# Patient Record
Sex: FEMALE | Race: WHITE | NOT HISPANIC OR LATINO | Employment: FULL TIME | URBAN - NONMETROPOLITAN AREA
[De-identification: names, ages, dates, MRNs, and addresses within clinical notes are randomized per-mention and may not be internally consistent; named-entity substitution may affect disease eponyms.]

---
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
## 2020-01-09 PROCEDURE — 93272 ECG/REVIEW INTERPRET ONLY: CPT | Performed by: NURSE PRACTITIONER

## 2020-01-11 ENCOUNTER — CLINICAL SUPPORT NO REQUIREMENTS (OUTPATIENT)
Dept: CARDIOLOGY | Facility: CLINIC | Age: 20
End: 2020-01-11

## 2020-01-11 DIAGNOSIS — R00.2 PALPITATIONS: ICD-10-CM

## 2020-01-11 DIAGNOSIS — R55 SYNCOPE AND COLLAPSE: ICD-10-CM

## 2020-01-13 ENCOUNTER — OFFICE VISIT (OUTPATIENT)
Dept: CARDIOLOGY | Facility: CLINIC | Age: 20
End: 2020-01-13

## 2020-01-13 VITALS
DIASTOLIC BLOOD PRESSURE: 59 MMHG | BODY MASS INDEX: 20.97 KG/M2 | OXYGEN SATURATION: 99 % | WEIGHT: 133.6 LBS | SYSTOLIC BLOOD PRESSURE: 112 MMHG | HEART RATE: 53 BPM | HEIGHT: 67 IN

## 2020-01-13 DIAGNOSIS — R00.2 PALPITATIONS: Primary | ICD-10-CM

## 2020-01-13 DIAGNOSIS — R55 SYNCOPE AND COLLAPSE: ICD-10-CM

## 2020-01-13 PROCEDURE — 99213 OFFICE O/P EST LOW 20 MIN: CPT | Performed by: NURSE PRACTITIONER

## 2020-01-13 NOTE — PROGRESS NOTES
Subjective     Chief Complaint: Palpitations; Loss of Consciousness; and Results    History of Present Illness   Irina Winter is a 19 y.o. female who presents without significant past medical history.  She was initially referred to our clinic for episodes of syncope and near syncope as well as palpitations.  48-hour Holter monitor, ordered by her PCP, was benign.  Her initial visit with me I ordered a 30-day event on her chart and echocardiogram.    The patient's 30-day event monitor record has been reviewed.  There were no episodes of atrial fibrillation.  The patient remained in sinus rhythm or sinus arrhythmia throughout.  Her base heart rate was 79 bpm, max 158 bpm and she had 2 episodes of sinus tachycardia.    At today's visit the patient reports that during the monitoring time she had one episode of dizziness.  She reports she sat down on her bed, symptoms resolved, and did not have any syncope.  She continues to report palpitations.        Current Outpatient Medications:   •  cetaphil (CETAPHIL) lotion, Apply  topically to the appropriate area as directed 2 (Two) Times a Day., Disp: , Rfl:   •  medroxyPROGESTERone (DEPO-PROVERA) 150 MG/ML injection, Inject 150 mg into the appropriate muscle as directed by prescriber Every 3 (Three) Months., Disp: , Rfl:   •  ondansetron (ZOFRAN) 4 MG tablet, TAKE 1 TABLET BY MOUTH THREE TIMES DAILY AS NEEDED FOR NAUSEA FOR 7 DAYS, Disp: , Rfl: 0  •  triamcinolone (KENALOG) 0.1 % cream, APPLY CREAM EXTERNALLY TO AFFECTED AREA TWICE DAILY MIX WITH CETAPHIL AS DIRECTED, Disp: , Rfl: 0     On this date:  The following portions of the patient's history were reviewed and updated as appropriate: allergies, current medications, past family history, past medical history, past social history, past surgical history and problem list.    Review of Systems   Constitution: Negative.   HENT: Negative.    Eyes: Negative.    Cardiovascular: Positive for near-syncope and palpitations.  "  Endocrine: Negative.    Hematologic/Lymphatic: Bruises/bleeds easily.   Skin: Negative.    Musculoskeletal: Negative.    Gastrointestinal: Negative.    Genitourinary: Negative.    Neurological: Negative.    Psychiatric/Behavioral: Negative.    Allergic/Immunologic: Negative.          Objective     /59 (BP Location: Left arm)   Pulse 53   Ht 170.2 cm (67\")   Wt 60.6 kg (133 lb 9.6 oz)   SpO2 99%   BMI 20.92 kg/m²     Physical Exam   Constitutional: She is oriented to person, place, and time. She appears well-developed and well-nourished.   HENT:   Head: Normocephalic and atraumatic.   Eyes: Pupils are equal, round, and reactive to light.   Neck: No JVD present.   Cardiovascular: Normal rate, regular rhythm and intact distal pulses. Exam reveals no gallop and no friction rub.   No murmur heard.  Pulmonary/Chest: Effort normal and breath sounds normal. No respiratory distress. She has no wheezes. She has no rales.   Neurological: She is alert and oriented to person, place, and time.   Skin: Skin is warm and dry.   Psychiatric: She has a normal mood and affect.   Vitals reviewed.      Procedures      Assessment/Plan       Irina was seen today for palpitations, loss of consciousness and results.    Diagnoses and all orders for this visit:    Palpitations    Syncope and collapse          Plan of care was reviewed. Patient agreed with plan of care.    We will plan to have patient's echocardiogram rescheduled and she will return to clinic sometime after that.    Return in about 3 weeks (around 2/3/2020).      FATIMAH Hernandez  "

## 2020-02-17 ENCOUNTER — OFFICE VISIT (OUTPATIENT)
Dept: CARDIOLOGY | Facility: CLINIC | Age: 20
End: 2020-02-17

## 2020-02-17 VITALS
OXYGEN SATURATION: 99 % | BODY MASS INDEX: 21.53 KG/M2 | HEART RATE: 68 BPM | HEIGHT: 67 IN | DIASTOLIC BLOOD PRESSURE: 69 MMHG | WEIGHT: 137.2 LBS | SYSTOLIC BLOOD PRESSURE: 113 MMHG

## 2020-02-17 DIAGNOSIS — R00.2 PALPITATIONS: Primary | ICD-10-CM

## 2020-02-17 DIAGNOSIS — R55 SYNCOPE AND COLLAPSE: ICD-10-CM

## 2020-02-17 PROCEDURE — 99212 OFFICE O/P EST SF 10 MIN: CPT | Performed by: NURSE PRACTITIONER

## 2020-02-17 NOTE — PROGRESS NOTES
Subjective     Chief Complaint: Palpitations and Loss of Consciousness    History of Present Illness   Irina Winter is a 19 y.o. female who presents without significant past medical history.  She initially was referred to our clinic for episodes of syncope and near syncope as well as palpitations.  48-hour Holter monitor, ordered by her PCP, was benign.  At her initial visit both a 30-day event monitor and echocardiogram were ordered.  The event monitor was unremarkable.  The echocardiogram had not been completed because the patient had missed her procedure date.  She presents today for follow-up.    At today's visit she reports that she has not had any additional episodes of syncope since last seen.  She does report one episode of dizziness at which time she was able to sit down and the dizziness passed.  She has yet to have her echocardiogram completed.    Current Outpatient Medications:   •  cetaphil (CETAPHIL) lotion, Apply  topically to the appropriate area as directed 2 (Two) Times a Day., Disp: , Rfl:   •  medroxyPROGESTERone (DEPO-PROVERA) 150 MG/ML injection, Inject 150 mg into the appropriate muscle as directed by prescriber Every 3 (Three) Months., Disp: , Rfl:   •  ondansetron (ZOFRAN) 4 MG tablet, TAKE 1 TABLET BY MOUTH THREE TIMES DAILY AS NEEDED FOR NAUSEA FOR 7 DAYS, Disp: , Rfl: 0  •  triamcinolone (KENALOG) 0.1 % cream, APPLY CREAM EXTERNALLY TO AFFECTED AREA TWICE DAILY MIX WITH CETAPHIL AS DIRECTED, Disp: , Rfl: 0     On this date:  The following portions of the patient's history were reviewed and updated as appropriate: allergies, current medications, past family history, past medical history, past social history, past surgical history and problem list.    Review of Systems   Constitution: Negative for malaise/fatigue.   Cardiovascular: Negative for chest pain, dyspnea on exertion, irregular heartbeat, leg swelling, near-syncope, orthopnea, palpitations, paroxysmal nocturnal dyspnea and  "syncope.   Respiratory: Negative for shortness of breath.    Hematologic/Lymphatic: Does not bruise/bleed easily.   Neurological: Positive for dizziness. Negative for light-headedness and weakness.         Objective     /69 (BP Location: Left arm)   Pulse 68   Ht 170.2 cm (67\")   Wt 62.2 kg (137 lb 3.2 oz)   SpO2 99%   BMI 21.49 kg/m²     Physical Exam   Constitutional: She appears well-developed and well-nourished.   HENT:   Head: Normocephalic and atraumatic.   Eyes: Pupils are equal, round, and reactive to light.   Neck: No JVD present.   Cardiovascular: Normal rate, regular rhythm and intact distal pulses. Exam reveals no gallop and no friction rub.   No murmur heard.  Pulmonary/Chest: Effort normal and breath sounds normal. No respiratory distress. She has no wheezes. She has no rales.   Skin: Skin is warm and dry.   Psychiatric: She has a normal mood and affect.   Vitals reviewed.      Procedures      Assessment/Plan       Irina was seen today for palpitations and loss of consciousness.    Diagnoses and all orders for this visit:    Palpitations    Syncope and collapse      Plan of care was reviewed.  Patient agreed with plan of care.    Patient is to have echocardiogram completed and will be called with results.    Return if symptoms worsen or fail to improve.      FATIMAH Hernandez  "

## 2021-08-25 ENCOUNTER — TRANSCRIBE ORDERS (OUTPATIENT)
Dept: ADMINISTRATIVE | Facility: HOSPITAL | Age: 21
End: 2021-08-25

## 2021-08-25 DIAGNOSIS — Z11.59 SPECIAL SCREENING EXAMINATION FOR UNSPECIFIED VIRAL DISEASE: Primary | ICD-10-CM

## 2021-08-27 ENCOUNTER — TRANSCRIBE ORDERS (OUTPATIENT)
Dept: ADMINISTRATIVE | Facility: HOSPITAL | Age: 21
End: 2021-08-27

## 2021-08-27 DIAGNOSIS — Z11.59 SPECIAL SCREENING EXAMINATION FOR UNSPECIFIED VIRAL DISEASE: Primary | ICD-10-CM

## 2021-11-01 ENCOUNTER — TRANSCRIBE ORDERS (OUTPATIENT)
Dept: ADMINISTRATIVE | Facility: HOSPITAL | Age: 21
End: 2021-11-01

## 2021-11-01 ENCOUNTER — HOSPITAL ENCOUNTER (OUTPATIENT)
Dept: RESPIRATORY THERAPY | Facility: HOSPITAL | Age: 21
Discharge: HOME | End: 2021-11-01
Admitting: NURSE PRACTITIONER

## 2021-11-01 DIAGNOSIS — R55 SYNCOPE AND COLLAPSE: Primary | ICD-10-CM

## 2021-11-01 DIAGNOSIS — R55 SYNCOPE AND COLLAPSE: ICD-10-CM

## 2021-11-01 PROCEDURE — 93246 EXT ECG>7D<15D RECORDING: CPT

## 2021-11-16 ENCOUNTER — HOSPITAL ENCOUNTER (OUTPATIENT)
Dept: CARDIOLOGY | Facility: HOSPITAL | Age: 21
Discharge: HOME | End: 2021-11-16
Admitting: NURSE PRACTITIONER

## 2021-11-16 DIAGNOSIS — R55 SYNCOPE AND COLLAPSE: ICD-10-CM

## 2021-11-16 LAB
BH CV ECHO MEAS - % IVS THICK: 31.6 %
BH CV ECHO MEAS - % LVPW THICK: 57.6 %
BH CV ECHO MEAS - ACS: 2.1 CM
BH CV ECHO MEAS - AO MAX PG: 6.1 MMHG
BH CV ECHO MEAS - AO MEAN PG: 3 MMHG
BH CV ECHO MEAS - AO ROOT AREA (BSA CORRECTED): 1.4
BH CV ECHO MEAS - AO ROOT AREA: 4.7 CM^2
BH CV ECHO MEAS - AO ROOT DIAM: 2.5 CM
BH CV ECHO MEAS - AO V2 MAX: 123 CM/SEC
BH CV ECHO MEAS - AO V2 MEAN: 87.5 CM/SEC
BH CV ECHO MEAS - AO V2 VTI: 30.4 CM
BH CV ECHO MEAS - BSA(HAYCOCK): 1.7 M^2
BH CV ECHO MEAS - BSA: 1.7 M^2
BH CV ECHO MEAS - BZI_BMI: 21.5 KILOGRAMS/M^2
BH CV ECHO MEAS - BZI_METRIC_HEIGHT: 170.2 CM
BH CV ECHO MEAS - BZI_METRIC_WEIGHT: 62.1 KG
BH CV ECHO MEAS - EDV(CUBED): 99.3 ML
BH CV ECHO MEAS - EDV(MOD-SP4): 73.7 ML
BH CV ECHO MEAS - EDV(TEICH): 98.8 ML
BH CV ECHO MEAS - EF(CUBED): 67.6 %
BH CV ECHO MEAS - EF(MOD-SP4): 64 %
BH CV ECHO MEAS - EF(TEICH): 59.2 %
BH CV ECHO MEAS - ESV(CUBED): 32.2 ML
BH CV ECHO MEAS - ESV(MOD-SP4): 26.5 ML
BH CV ECHO MEAS - ESV(TEICH): 40.3 ML
BH CV ECHO MEAS - FS: 31.3 %
BH CV ECHO MEAS - IVS/LVPW: 0.98
BH CV ECHO MEAS - IVSD: 0.78 CM
BH CV ECHO MEAS - IVSS: 1 CM
BH CV ECHO MEAS - LA DIMENSION: 2.3 CM
BH CV ECHO MEAS - LA/AO: 0.94
BH CV ECHO MEAS - LV DIASTOLIC VOL/BSA (35-75): 42.8 ML/M^2
BH CV ECHO MEAS - LV MASS(C)D: 117.5 GRAMS
BH CV ECHO MEAS - LV MASS(C)DI: 68.2 GRAMS/M^2
BH CV ECHO MEAS - LV MASS(C)S: 110 GRAMS
BH CV ECHO MEAS - LV MASS(C)SI: 63.9 GRAMS/M^2
BH CV ECHO MEAS - LV SYSTOLIC VOL/BSA (12-30): 15.4 ML/M^2
BH CV ECHO MEAS - LVIDD: 4.6 CM
BH CV ECHO MEAS - LVIDS: 3.2 CM
BH CV ECHO MEAS - LVLD AP4: 8.2 CM
BH CV ECHO MEAS - LVLS AP4: 7 CM
BH CV ECHO MEAS - LVOT AREA (M): 2.8 CM^2
BH CV ECHO MEAS - LVOT AREA: 2.8 CM^2
BH CV ECHO MEAS - LVOT DIAM: 1.9 CM
BH CV ECHO MEAS - LVPWD: 0.8 CM
BH CV ECHO MEAS - LVPWS: 1.3 CM
BH CV ECHO MEAS - MV A MAX VEL: 43.8 CM/SEC
BH CV ECHO MEAS - MV E MAX VEL: 93.5 CM/SEC
BH CV ECHO MEAS - MV E/A: 2.1
BH CV ECHO MEAS - PA ACC TIME: 0.17 SEC
BH CV ECHO MEAS - PA PR(ACCEL): 1.2 MMHG
BH CV ECHO MEAS - RAP SYSTOLE: 10 MMHG
BH CV ECHO MEAS - RVSP: 24.6 MMHG
BH CV ECHO MEAS - SI(AO): 83.2 ML/M^2
BH CV ECHO MEAS - SI(CUBED): 39 ML/M^2
BH CV ECHO MEAS - SI(MOD-SP4): 27.4 ML/M^2
BH CV ECHO MEAS - SI(TEICH): 34 ML/M^2
BH CV ECHO MEAS - SV(AO): 143.3 ML
BH CV ECHO MEAS - SV(CUBED): 67.1 ML
BH CV ECHO MEAS - SV(MOD-SP4): 47.2 ML
BH CV ECHO MEAS - SV(TEICH): 58.5 ML
BH CV ECHO MEAS - TR MAX VEL: 191 CM/SEC
MAXIMAL PREDICTED HEART RATE: 199 BPM
STRESS TARGET HR: 169 BPM

## 2021-11-16 PROCEDURE — 93306 TTE W/DOPPLER COMPLETE: CPT | Performed by: INTERNAL MEDICINE

## 2021-11-16 PROCEDURE — 93306 TTE W/DOPPLER COMPLETE: CPT

## 2021-11-30 ENCOUNTER — TRANSCRIBE ORDERS (OUTPATIENT)
Dept: ADMINISTRATIVE | Facility: HOSPITAL | Age: 21
End: 2021-11-30

## 2021-11-30 DIAGNOSIS — R56.9 WITNESSED SEIZURE-LIKE ACTIVITY (HCC): Primary | ICD-10-CM

## 2021-12-07 ENCOUNTER — APPOINTMENT (OUTPATIENT)
Dept: CT IMAGING | Facility: HOSPITAL | Age: 21
End: 2021-12-07

## 2024-06-20 ENCOUNTER — TRANSCRIBE ORDERS (OUTPATIENT)
Dept: ADMINISTRATIVE | Facility: HOSPITAL | Age: 24
End: 2024-06-20
Payer: COMMERCIAL

## 2024-06-20 DIAGNOSIS — R59.0 CERVICAL LYMPHADENOPATHY: Primary | ICD-10-CM

## 2024-09-03 ENCOUNTER — OFFICE VISIT (OUTPATIENT)
Dept: CARDIOLOGY | Facility: CLINIC | Age: 24
End: 2024-09-03
Payer: COMMERCIAL

## 2024-09-03 VITALS — BODY MASS INDEX: 20.59 KG/M2 | WEIGHT: 131.2 LBS | OXYGEN SATURATION: 99 % | HEIGHT: 67 IN

## 2024-09-03 DIAGNOSIS — R06.02 SHORTNESS OF BREATH: ICD-10-CM

## 2024-09-03 DIAGNOSIS — R55 SYNCOPE AND COLLAPSE: ICD-10-CM

## 2024-09-03 DIAGNOSIS — R42 DIZZINESS: ICD-10-CM

## 2024-09-03 DIAGNOSIS — R00.2 PALPITATIONS: Primary | ICD-10-CM

## 2024-09-03 PROBLEM — J45.909 ASTHMA: Status: ACTIVE | Noted: 2024-09-03

## 2024-09-03 PROCEDURE — 99204 OFFICE O/P NEW MOD 45 MIN: CPT | Performed by: INTERNAL MEDICINE

## 2024-09-03 PROCEDURE — 93000 ELECTROCARDIOGRAM COMPLETE: CPT | Performed by: INTERNAL MEDICINE

## 2024-09-03 RX ORDER — ATENOLOL 25 MG/1
12.5 TABLET ORAL DAILY
Qty: 15 TABLET | Refills: 11 | Status: SHIPPED | OUTPATIENT
Start: 2024-09-03

## 2024-09-03 NOTE — PROGRESS NOTES
Subjective   Irina Winter is a 24 y.o. female     Chief Complaint   Patient presents with    Establish Care     Here for eval. Per pcp    Dizziness    Syncope    Shortness of Breath    Palpitations       PROBLEM LIST:     Syncope  SOB  Palpitations  Asthma    Denies Rheumatic / Scarlet fever    Specialty Problems          Cardiology Problems    Palpitations             HPI:  Ms. Irina Winter is a 24-year-old female patient of [facility] seen today for evaluation of syncope.    The patient describes episodes of severe dizziness and syncope dating to age 13.  Symptoms have been primarily orthostatic in nature although the patient describes presyncope without significant orthostatic change.  Symptoms seem to be worsened by emotional stress.  As of late the patient has increased sodium intake and symptoms seem to have improved.  She describes that, most typically, she experiences palpitations before episodes of presyncope or syncope.  There is no loss of bowel or bladder control, no postictal state, and no Aurelio's paralysis.  However, the patient feels profoundly fatigued after these episodes.    Symptoms have diminished in frequency, intensity, and duration of late.  They now occur on average approximately once monthly.  Prior event monitor demonstrated no dysrhythmic substrate, by report, the patient had an episode of syncope.  Ms. Winter has had extensive neurologic evaluation including EEG and head imaging.  She was felt not to have a significant seizure disorder or neurologic event contributing to ongoing symptoms.                      PRIOR MEDICATIONS    Current Outpatient Medications on File Prior to Visit   Medication Sig Dispense Refill    [DISCONTINUED] cetaphil (CETAPHIL) lotion Apply  topically to the appropriate area as directed 2 (Two) Times a Day.      [DISCONTINUED] medroxyPROGESTERone (DEPO-PROVERA) 150 MG/ML injection Inject 150 mg into the appropriate muscle as directed by prescriber Every 3  (Three) Months.      [DISCONTINUED] ondansetron (ZOFRAN) 4 MG tablet TAKE 1 TABLET BY MOUTH THREE TIMES DAILY AS NEEDED FOR NAUSEA FOR 7 DAYS  0    [DISCONTINUED] triamcinolone (KENALOG) 0.1 % cream APPLY CREAM EXTERNALLY TO AFFECTED AREA TWICE DAILY MIX WITH CETAPHIL AS DIRECTED  0     No current facility-administered medications on file prior to visit.       ALLERGIES:    Patient has no known allergies.    PAST MEDICAL HISTORY:    Past Medical History:   Diagnosis Date    Asthma     Eczema        SURGICAL HISTORY:    Past Surgical History:   Procedure Laterality Date    WISDOM TOOTH EXTRACTION         SOCIAL HISTORY:    Social History     Socioeconomic History    Marital status: Single   Tobacco Use    Smoking status: Never    Smokeless tobacco: Never   Substance and Sexual Activity    Alcohol use: No    Drug use: No    Sexual activity: Defer       FAMILY HISTORY:    Family History   Problem Relation Age of Onset    Hypertension Mother     Anxiety disorder Father        Review of Systems   Constitutional:  Positive for fatigue. Negative for chills, diaphoresis, fever and unexpected weight change.   HENT: Negative.     Eyes:  Positive for visual disturbance (glasses prn).   Respiratory:  Positive for shortness of breath.    Cardiovascular:  Positive for palpitations and leg swelling (occas. after syncopal episodes). Negative for chest pain.   Gastrointestinal:  Negative for blood in stool (denies melena,hemoptysis), constipation and diarrhea.   Endocrine: Negative for cold intolerance and heat intolerance.   Genitourinary: Negative.    Musculoskeletal: Negative.    Skin: Negative.    Allergic/Immunologic: Negative.    Neurological:  Positive for dizziness, syncope, weakness (overall after episodes) and light-headedness.   Hematological:  Bruises/bleeds easily.   Psychiatric/Behavioral:  Positive for sleep disturbance.        VISIT VITALS:  Vitals:    09/03/24 1327   BP: 129/79   BP Location: Left arm   Patient  "Position: Sitting   Pulse: 81   SpO2: 99%   Weight: 59.5 kg (131 lb 3.2 oz)   Height: 170.2 cm (67\")      /79 (BP Location: Left arm, Patient Position: Sitting)   Pulse 81   Ht 170.2 cm (67\")   Wt 59.5 kg (131 lb 3.2 oz)   SpO2 99%   BMI 20.55 kg/m²     RECENT LABS:    Objective       Physical Exam  Vitals and nursing note reviewed.   Constitutional:       General: She is not in acute distress.     Appearance: She is well-developed.   HENT:      Head: Normocephalic and atraumatic.   Eyes:      Conjunctiva/sclera: Conjunctivae normal.      Pupils: Pupils are equal, round, and reactive to light.      Comments: No ptosis or lid lag  Extra ocular motions intact  PRANAY   Neck:      Vascular: No carotid bruit, hepatojugular reflux or JVD.      Trachea: No tracheal deviation.      Comments: Nl. Carotid upstrokes  Cardiovascular:      Rate and Rhythm: Normal rate and regular rhythm.      Pulses:           Radial pulses are 2+ on the right side and 2+ on the left side.      Heart sounds: Normal heart sounds, S1 normal and S2 normal. No murmur heard.     No friction rub. No S3 or S4 sounds.   Pulmonary:      Effort: Pulmonary effort is normal.      Breath sounds: Normal breath sounds. No wheezing, rhonchi or rales.      Comments: Nl. Expir. Phase  Nl. Breath sound intensity  Abdominal:      General: Bowel sounds are normal. There is no distension or abdominal bruit.      Palpations: Abdomen is soft. There is no mass.      Tenderness: There is no abdominal tenderness. There is no guarding or rebound.      Comments: No organomegaly   Musculoskeletal:         General: No tenderness or deformity. Normal range of motion.      Cervical back: Normal range of motion and neck supple.      Right lower leg: No edema.      Left lower leg: No edema.      Comments: LLE, no edema, palpable pedal pulses  RLE, no edema, palpable pedal pulses   Skin:     General: Skin is warm and dry.      Coloration: Skin is not pale.      " Findings: No erythema or rash.   Neurological:      Mental Status: She is alert and oriented to person, place, and time.   Psychiatric:         Behavior: Behavior normal.         Thought Content: Thought content normal.         Judgment: Judgment normal.         ECG 12 Lead    Date/Time: 9/3/2024 1:32 PM  Performed by: Ramos Braun MD    Authorized by: Ramos Braun MD  Comparison: compared with previous ECG from 12/11/2019          Assessment & Plan   #1.  Predominantly orthostatic presyncope and syncope.  I think that POTS is a reasonable presumptive diagnosis given the patient's current symptoms.  We discussed etiologies and treatment options.  I would like to start very low-dose beta-blocker with a atenolol 12.5 mg daily particularly given the patient's symptoms of palpitations preceding events.  Additionally, beta-blocker should be safe if the patient were to become pregnant.  If symptoms are not improved or are inadequately controlled we will increase beta-blocker therapy then add mineralocorticoid as needed.  If symptoms cannot be adequately controlled with those maneuvers we will refer to POTS clinic.    2.  Palpitations.  Prior event monitoring was unrevealing.  I see no need to repeat that study at present.    3.  Ms. Winter will follow with Mary Ann Watts as instructed we will plan on seeing her in follow-up after 1 month or on appearing basis as discussed.   Diagnosis Plan   1. Palpitations        2. Syncope and collapse        3. Shortness of breath        4. Dizziness            No follow-ups on file.         Irina Winter  reports that she has never smoked. She has never used smokeless tobacco. I have educated her on the risk of diseases from using tobacco products such as cancer, COPD, and heart disease.               BMI is within normal parameters. No other follow-up for BMI required.             Electronically signed by:    Scribed for Ramos Braun MD by Ana Mazariegos LPN on September  3, 2024  at 13:31 EDT    I, Ramos Braun MD personally performed the services described in this documentation as scribed by the above named individual in my presence, and it is both accurate and complete. September 3, 2024 13:31 EDT      Dictated Utilizing Dragon Dictation: Part of this note may be an electronic transcription/translation of spoken language to printed text using the Dragon Dictation System.

## 2024-11-14 ENCOUNTER — TELEPHONE (OUTPATIENT)
Dept: CARDIOLOGY | Facility: CLINIC | Age: 24
End: 2024-11-14
Payer: COMMERCIAL

## 2024-11-14 NOTE — TELEPHONE ENCOUNTER
Caller: Irina Winter    Relationship: Self    Best call back number: [phone]    What is the best time to reach you: ANY    Who are you requesting to speak with (clinical staff, provider,  specific staff member): CLINICAL    What was the call regarding: DUE TO PT'S DX, SHE IS WONDERING IF DR. 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 SHE DRIVES AROUND 1 HOUR TOTAL TIME AND IS WORRIED WITH HER POTS THAT IF IT FLARES UP BECAUSE SHE DOES WORK THIRD SHIFT, SHE IS WORRIED SOMETHING MIGHT HAPPEN IN THE MIDDLE OF THE NIGHT WHILE DRIVING.

## 2024-11-20 ENCOUNTER — OFFICE VISIT (OUTPATIENT)
Dept: CARDIOLOGY | Facility: CLINIC | Age: 24
End: 2024-11-20
Payer: COMMERCIAL

## 2024-11-20 VITALS
HEIGHT: 67 IN | BODY MASS INDEX: 21.82 KG/M2 | WEIGHT: 139 LBS | DIASTOLIC BLOOD PRESSURE: 74 MMHG | SYSTOLIC BLOOD PRESSURE: 115 MMHG | OXYGEN SATURATION: 97 % | HEART RATE: 59 BPM

## 2024-11-20 DIAGNOSIS — R00.2 PALPITATIONS: Primary | ICD-10-CM

## 2024-11-20 DIAGNOSIS — G90.A POTS (POSTURAL ORTHOSTATIC TACHYCARDIA SYNDROME): ICD-10-CM

## 2024-11-20 DIAGNOSIS — R55 SYNCOPE AND COLLAPSE: ICD-10-CM

## 2024-11-20 PROCEDURE — 99214 OFFICE O/P EST MOD 30 MIN: CPT | Performed by: PHYSICIAN ASSISTANT

## 2024-11-20 RX ORDER — FLUDROCORTISONE ACETATE 0.1 MG/1
0.1 TABLET ORAL DAILY
Qty: 30 TABLET | Refills: 5 | Status: SHIPPED | OUTPATIENT
Start: 2024-11-20

## 2024-11-20 RX ORDER — METOPROLOL SUCCINATE 25 MG/1
12.5 TABLET, EXTENDED RELEASE ORAL DAILY
Qty: 30 TABLET | Refills: 11 | Status: SHIPPED | OUTPATIENT
Start: 2024-11-20

## 2024-11-20 NOTE — PROGRESS NOTES
Problem list     Subjective   Irina Winter is a 24 y.o. female     Chief Complaint   Patient presents with    Follow-up    Palpitations   PROBLEM LIST:      Syncope  SOB  Palpitations  Asthma       HPI    Patient is a 24-year-old female with history of POTS that presents back to the office for follow-up.  She was initially evaluated and placed on atenolol to help with palpitations.    Patient is doing better in regards to palpitations.  However, she is continue to have significant dizziness upon standing and presyncope.  She does not describe any chest pain or pressure.  No complaints of dyspnea.  No PND or orthopnea.    Otherwise, she is doing well.  Her dizziness seems to be the main issue.    Current Outpatient Medications on File Prior to Visit   Medication Sig Dispense Refill    [DISCONTINUED] atenolol (TENORMIN) 25 MG tablet Take 0.5 tablets by mouth Daily. 15 tablet 11     No current facility-administered medications on file prior to visit.       Patient has no known allergies.    Past Medical History:   Diagnosis Date    Asthma     Eczema     Palpitation     Syncope and collapse        Social History     Socioeconomic History    Marital status: Single   Tobacco Use    Smoking status: Never    Smokeless tobacco: Never   Substance and Sexual Activity    Alcohol use: No    Drug use: No    Sexual activity: Defer       Family History   Problem Relation Age of Onset    Hypertension Mother     Anxiety disorder Father        Review of Systems   Constitutional: Negative.  Negative for activity change, appetite change, chills, fatigue and fever.   HENT: Negative.  Negative for congestion, sinus pressure and sinus pain.    Eyes: Negative.  Negative for visual disturbance.   Respiratory: Negative.  Negative for apnea, cough, chest tightness, shortness of breath and wheezing.    Cardiovascular: Negative.  Positive for palpitations. Negative for chest pain and leg swelling.   Gastrointestinal: Negative.  Negative for  "blood in stool.   Endocrine: Negative.  Negative for cold intolerance and heat intolerance.   Genitourinary: Negative.  Negative for hematuria.   Musculoskeletal: Negative.  Negative for gait problem.   Skin: Negative.  Negative for color change, rash and wound.   Allergic/Immunologic: Negative.  Negative for environmental allergies and food allergies.   Neurological:  Positive for dizziness. Negative for syncope, weakness, light-headedness, numbness and headaches.        POTS   Hematological: Negative.  Does not bruise/bleed easily.   Psychiatric/Behavioral: Negative.  Negative for sleep disturbance.        Objective   Vitals:    11/20/24 0927   BP: 115/74   BP Location: Right arm   Patient Position: Sitting   Cuff Size: Adult   Pulse: 59   SpO2: 97%   Weight: 63 kg (139 lb)   Height: 170.2 cm (67\")      /74 (BP Location: Right arm, Patient Position: Sitting, Cuff Size: Adult)   Pulse 59   Ht 170.2 cm (67\")   Wt 63 kg (139 lb)   SpO2 97%   BMI 21.77 kg/m²     Lab Results (most recent)       None            Physical Exam  Vitals and nursing note reviewed.   Constitutional:       General: She is not in acute distress.     Appearance: Normal appearance. She is well-developed.   HENT:      Head: Normocephalic and atraumatic.   Eyes:      General: No scleral icterus.        Right eye: No discharge.         Left eye: No discharge.      Conjunctiva/sclera: Conjunctivae normal.   Neck:      Vascular: No carotid bruit.   Cardiovascular:      Rate and Rhythm: Normal rate and regular rhythm.      Heart sounds: Normal heart sounds. No murmur heard.     No friction rub. No gallop.   Pulmonary:      Effort: Pulmonary effort is normal. No respiratory distress.      Breath sounds: Normal breath sounds. No wheezing or rales.   Chest:      Chest wall: No tenderness.   Musculoskeletal:      Right lower leg: No edema.      Left lower leg: No edema.   Skin:     General: Skin is warm and dry.      Coloration: Skin is not " pale.      Findings: No erythema or rash.   Neurological:      Mental Status: She is alert and oriented to person, place, and time.      Cranial Nerves: No cranial nerve deficit.   Psychiatric:         Behavior: Behavior normal.         Procedure   Procedures       Assessment & Plan     Problems Addressed this Visit          Cardiac and Vasculature    Palpitations - Primary    POTS (postural orthostatic tachycardia syndrome)    Relevant Medications    metoprolol succinate XL (TOPROL-XL) 25 MG 24 hr tablet       Symptoms and Signs    Syncope and collapse     Diagnoses         Codes Comments    Palpitations    -  Primary ICD-10-CM: R00.2  ICD-9-CM: 785.1     Syncope and collapse     ICD-10-CM: R55  ICD-9-CM: 780.2     POTS (postural orthostatic tachycardia syndrome)     ICD-10-CM: G90.A  ICD-9-CM: 427.89             Recommendations  1.  Patient is a 24-year-old female presenting back to the office for evaluation.  Patient has history of POTS and has had issues with dizziness.  Underwent placed on fludrocortisone significant from that standpoint want to call back in 1 week with symptom check.    2.  We are stopping atenolol transitioning to metoprolol to help in regards to palpitations and for a safer pregnancy category.  We did discuss that if she became pregnant, to let us know as she may need to stop these medications.    3.  Otherwise, her syncope is likely related to POTS.  She is healthy otherwise and previous testing being benign.  We will monitor for now and continue the same.  We will see her back for follow-up in 4 to 6 months or sooner if needed.  Follow-up with primary as scheduled.         Patient did not bring med list or medicine bottles to appointment, med list has been reviewed and updated based on patient's knowledge of their meds.        Electronically signed by:

## 2024-11-20 NOTE — LETTER
November 20, 2024     Mary Ann Watts, APRN  [address] [facility] [address]    Patient: Irina Winter   YOB: 2000   Date of Visit: 11/20/2024       Dear Mary Ann Watts    Irina Winter was in my office today. Below is a copy of my note.    If you have questions, please do not hesitate to call me. I look forward to following Irina along with you.         Sincerely,        KWAME Lemons        CC: No Recipients    Problem list     Subjective  Irina Winter is a 24 y.o. female     Chief Complaint   Patient presents with   • Follow-up   • Palpitations   PROBLEM LIST:      Syncope  SOB  Palpitations  Asthma       HPI    Patient is a 24-year-old female with history of POTS that presents back to the office for follow-up.  She was initially evaluated and placed on atenolol to help with palpitations.    Patient is doing better in regards to palpitations.  However, she is continue to have significant dizziness upon standing and presyncope.  She does not describe any chest pain or pressure.  No complaints of dyspnea.  No PND or orthopnea.    Otherwise, she is doing well.  Her dizziness seems to be the main issue.    Current Outpatient Medications on File Prior to Visit   Medication Sig Dispense Refill   • [DISCONTINUED] atenolol (TENORMIN) 25 MG tablet Take 0.5 tablets by mouth Daily. 15 tablet 11     No current facility-administered medications on file prior to visit.       Patient has no known allergies.    Past Medical History:   Diagnosis Date   • Asthma    • Eczema    • Palpitation    • Syncope and collapse        Social History     Socioeconomic History   • Marital status: Single   Tobacco Use   • Smoking status: Never   • Smokeless tobacco: Never   Substance and Sexual Activity   • Alcohol use: No   • Drug use: No   • Sexual activity: Defer       Family History   Problem Relation Age of Onset   • Hypertension Mother    • Anxiety disorder Father        Review of Systems   Constitutional: Negative.   "Negative for activity change, appetite change, chills, fatigue and fever.   HENT: Negative.  Negative for congestion, sinus pressure and sinus pain.    Eyes: Negative.  Negative for visual disturbance.   Respiratory: Negative.  Negative for apnea, cough, chest tightness, shortness of breath and wheezing.    Cardiovascular: Negative.  Positive for palpitations. Negative for chest pain and leg swelling.   Gastrointestinal: Negative.  Negative for blood in stool.   Endocrine: Negative.  Negative for cold intolerance and heat intolerance.   Genitourinary: Negative.  Negative for hematuria.   Musculoskeletal: Negative.  Negative for gait problem.   Skin: Negative.  Negative for color change, rash and wound.   Allergic/Immunologic: Negative.  Negative for environmental allergies and food allergies.   Neurological:  Positive for dizziness. Negative for syncope, weakness, light-headedness, numbness and headaches.        POTS   Hematological: Negative.  Does not bruise/bleed easily.   Psychiatric/Behavioral: Negative.  Negative for sleep disturbance.        Objective  Vitals:    11/20/24 0927   BP: 115/74   BP Location: Right arm   Patient Position: Sitting   Cuff Size: Adult   Pulse: 59   SpO2: 97%   Weight: 63 kg (139 lb)   Height: 170.2 cm (67\")      /74 (BP Location: Right arm, Patient Position: Sitting, Cuff Size: Adult)   Pulse 59   Ht 170.2 cm (67\")   Wt 63 kg (139 lb)   SpO2 97%   BMI 21.77 kg/m²     Lab Results (most recent)       None            Physical Exam  Vitals and nursing note reviewed.   Constitutional:       General: She is not in acute distress.     Appearance: Normal appearance. She is well-developed.   HENT:      Head: Normocephalic and atraumatic.   Eyes:      General: No scleral icterus.        Right eye: No discharge.         Left eye: No discharge.      Conjunctiva/sclera: Conjunctivae normal.   Neck:      Vascular: No carotid bruit.   Cardiovascular:      Rate and Rhythm: Normal rate and " regular rhythm.      Heart sounds: Normal heart sounds. No murmur heard.     No friction rub. No gallop.   Pulmonary:      Effort: Pulmonary effort is normal. No respiratory distress.      Breath sounds: Normal breath sounds. No wheezing or rales.   Chest:      Chest wall: No tenderness.   Musculoskeletal:      Right lower leg: No edema.      Left lower leg: No edema.   Skin:     General: Skin is warm and dry.      Coloration: Skin is not pale.      Findings: No erythema or rash.   Neurological:      Mental Status: She is alert and oriented to person, place, and time.      Cranial Nerves: No cranial nerve deficit.   Psychiatric:         Behavior: Behavior normal.         Procedure  Procedures       Assessment & Plan    Problems Addressed this Visit          Cardiac and Vasculature    Palpitations - Primary    POTS (postural orthostatic tachycardia syndrome)    Relevant Medications    metoprolol succinate XL (TOPROL-XL) 25 MG 24 hr tablet       Symptoms and Signs    Syncope and collapse     Diagnoses         Codes Comments    Palpitations    -  Primary ICD-10-CM: R00.2  ICD-9-CM: 785.1     Syncope and collapse     ICD-10-CM: R55  ICD-9-CM: 780.2     POTS (postural orthostatic tachycardia syndrome)     ICD-10-CM: G90.A  ICD-9-CM: 427.89             Recommendations  1.  Patient is a 24-year-old female presenting back to the office for evaluation.  Patient has history of POTS and has had issues with dizziness.  Underwent placed on fludrocortisone significant from that standpoint want to call back in 1 week with symptom check.    2.  We are stopping atenolol transitioning to metoprolol to help in regards to palpitations and for a safer pregnancy category.  We did discuss that if she became pregnant, to let us know as she may need to stop these medications.    3.  Otherwise, her syncope is likely related to POTS.  She is healthy otherwise and previous testing being benign.  We will monitor for now and continue the same.   We will see her back for follow-up in 4 to 6 months or sooner if needed.  Follow-up with primary as scheduled.         Patient did not bring med list or medicine bottles to appointment, med list has been reviewed and updated based on patient's knowledge of their meds.        Electronically signed by:

## 2024-12-20 ENCOUNTER — TELEMEDICINE (OUTPATIENT)
Dept: FAMILY MEDICINE CLINIC | Facility: TELEHEALTH | Age: 24
End: 2024-12-20
Payer: COMMERCIAL

## 2024-12-20 DIAGNOSIS — H92.03 OTALGIA OF BOTH EARS: ICD-10-CM

## 2024-12-20 DIAGNOSIS — J02.9 ACUTE PHARYNGITIS, UNSPECIFIED ETIOLOGY: Primary | ICD-10-CM

## 2024-12-20 RX ORDER — CHLORCYCLIZINE HYDROCHLORIDE AND PSEUDOEPHEDRINE HYDROCHLORIDE 25; 60 MG/1; MG/1
1 TABLET ORAL EVERY 8 HOURS PRN
Qty: 21 TABLET | Refills: 0 | Status: SHIPPED | OUTPATIENT
Start: 2024-12-20 | End: 2024-12-25

## 2024-12-20 RX ORDER — FLUTICASONE PROPIONATE 50 MCG
2 SPRAY, SUSPENSION (ML) NASAL DAILY
Qty: 64 G | Refills: 0 | Status: SHIPPED | OUTPATIENT
Start: 2024-12-20

## 2024-12-20 NOTE — PROGRESS NOTES
You have chosen to receive care through a telehealth visit.  Do you consent to use a video/audio connection for your medical care today? Yes     HPI  History of Present Illness  The patient is a 24-year-old female who presents via virtual visit for evaluation of a sore throat, cough, and ear infection.    She reports that she was not ill approximately a week ago but developed symptoms after returning home from an onsite training for her job. She is currently experiencing a sore throat, severe cough, and rhinorrhea. She suspects a bilateral ear infection, as she typically develops such infections following a sore throat. She has not been prescribed any steroids recently. She is currently on medication for POTS.    SOCIAL HISTORY  She works from home.    ALLERGIES  The patient has no known allergies.    MEDICATIONS  Toprol, fludrocortisone    Review of Systems    Past Medical History:   Diagnosis Date    Asthma     Eczema     Palpitation     Syncope and collapse        Family History   Problem Relation Age of Onset    Hypertension Mother     Anxiety disorder Father        Social History     Socioeconomic History    Marital status: Single   Tobacco Use    Smoking status: Never    Smokeless tobacco: Never   Substance and Sexual Activity    Alcohol use: No    Drug use: No    Sexual activity: Defer         There were no vitals taken for this visit.    PHYSICAL EXAM  Virtual Visit Physical Exam  Physical Exam      Diagnoses and all orders for this visit:    1. Acute pharyngitis, unspecified etiology (Primary)  -     Chlorcyclizine-Pseudoephed (Stahist AD) 25-60 MG tablet; Take 1 tablet by mouth Every 8 (Eight) Hours As Needed (congestion and allergies) for up to 5 days.  Dispense: 21 tablet; Refill: 0  -     fluticasone (FLONASE) 50 MCG/ACT nasal spray; Administer 2 sprays into the nostril(s) as directed by provider Daily.  Dispense: 64 g; Refill: 0    2. Otalgia of both ears  -     Chlorcyclizine-Pseudoephed (Stahist AD)  25-60 MG tablet; Take 1 tablet by mouth Every 8 (Eight) Hours As Needed (congestion and allergies) for up to 5 days.  Dispense: 21 tablet; Refill: 0  -     fluticasone (FLONASE) 50 MCG/ACT nasal spray; Administer 2 sprays into the nostril(s) as directed by provider Daily.  Dispense: 64 g; Refill: 0      Assessment & Plan  1. Upper respiratory infection.  She reports a sore throat, severe cough, and runny nose. Differential diagnosis includes COVID-19, influenza, and other viral infections. The patient also reports a history of double ear infections associated with sore throats. There is a possibility of fluid behind the ears, but due to the inability to examine her ears during this virtual visit, a definitive diagnosis can not be made. Sudafed will be prescribed to help with the pressure. Flonase and an antihistamine are also recommended. The use of steroids will be evaluated based on her current medications, including Toprol and fludrocortisone. She is advised to visit an urgent treatment center for further evaluation and testing if her symptoms do not improve within a day or two.      FOLLOW-UP  As discussed during visit with [facility], if symptoms worsen or fail to improve, follow-up with PCP/Urgent Care/Emergency Department.    Patient verbalizes understanding of medications, instructions for treatment and follow-up.    Patient or patient representative verbalized consent for the use of Ambient Listening during the visit with  FATIMAH Quiñonez for chart documentation. 12/20/2024  11:44 EST    FATIMAH Quiñonez  12/20/2024  11:44 EST    The use of a video visit has been reviewed with the patient and verbal informed consent has been obtained. Myself and Irina Winter participated in this visit. The patient is located in [facility], and I am located in [place], [place]. [employer] and [employer]  were utilized.

## 2025-05-27 ENCOUNTER — OFFICE VISIT (OUTPATIENT)
Dept: CARDIOLOGY | Facility: CLINIC | Age: 25
End: 2025-05-27
Payer: COMMERCIAL

## 2025-05-27 VITALS
WEIGHT: 152 LBS | DIASTOLIC BLOOD PRESSURE: 71 MMHG | BODY MASS INDEX: 23.86 KG/M2 | OXYGEN SATURATION: 98 % | HEIGHT: 67 IN | SYSTOLIC BLOOD PRESSURE: 119 MMHG | HEART RATE: 61 BPM

## 2025-05-27 DIAGNOSIS — G90.A POTS (POSTURAL ORTHOSTATIC TACHYCARDIA SYNDROME): Primary | ICD-10-CM

## 2025-05-27 DIAGNOSIS — R42 DIZZINESS: ICD-10-CM

## 2025-05-27 DIAGNOSIS — R00.2 PALPITATIONS: ICD-10-CM

## 2025-05-27 PROCEDURE — 99213 OFFICE O/P EST LOW 20 MIN: CPT | Performed by: PHYSICIAN ASSISTANT

## 2025-05-27 RX ORDER — FLUDROCORTISONE ACETATE 0.1 MG/1
TABLET ORAL DAILY
Qty: 90 TABLET | Refills: 3 | Status: SHIPPED | OUTPATIENT
Start: 2025-05-27

## 2025-05-27 NOTE — PROGRESS NOTES
"Problem list     Subjective   Irina Winter is a 25 y.o. female     Chief Complaint   Patient presents with    6 month follow up     Palpitations   PROBLEM LIST:      Syncope  SOB  Palpitations  Asthma       HPI    Patient is a 25-year-old female with POTS that presents back to the office for follow-up.  She was placed on medication last visit with fludrocortisone to help with orthostatic issues.  She was placed on metoprolol to help regards to palpitations.    Clinically, she has felt significant improvement.  She has not described \"spells\".  She will feel occasional palpitations.  She can have some dizziness upon standing but it is manageable.    She does not describe any chest pain or dyspnea at all.  No complaints of any syncopal episodes.  She is stable otherwise.      Current Outpatient Medications on File Prior to Visit   Medication Sig Dispense Refill    fludrocortisone 0.1 MG tablet TAKE 1 TABLET BY MOUTH ONCE DAILY 90 tablet 3    metoprolol succinate XL (TOPROL-XL) 25 MG 24 hr tablet Take 0.5 tablets by mouth Daily. 30 tablet 11    [DISCONTINUED] fludrocortisone 0.1 MG tablet Take 1 tablet by mouth Daily. 30 tablet 5    [DISCONTINUED] fluticasone (FLONASE) 50 MCG/ACT nasal spray Administer 2 sprays into the nostril(s) as directed by provider Daily. 64 g 0     No current facility-administered medications on file prior to visit.       Patient has no known allergies.    Past Medical History:   Diagnosis Date    Asthma     Eczema     Palpitation     Syncope and collapse        Social History     Socioeconomic History    Marital status: Single   Tobacco Use    Smoking status: Never    Smokeless tobacco: Never   Vaping Use    Vaping status: Never Used   Substance and Sexual Activity    Alcohol use: No    Drug use: No    Sexual activity: Defer       Family History   Problem Relation Age of Onset    Hypertension Mother     Anxiety disorder Father        Review of Systems   Constitutional:  Negative for activity " "change, appetite change, chills, fatigue and fever.   HENT: Negative.  Negative for congestion, sinus pressure and sinus pain.    Eyes: Negative.  Negative for visual disturbance.   Respiratory: Negative.  Negative for apnea, cough, chest tightness, shortness of breath and wheezing.    Cardiovascular: Negative.  Negative for chest pain, palpitations and leg swelling.   Gastrointestinal: Negative.  Negative for blood in stool.   Endocrine: Negative.  Negative for cold intolerance and heat intolerance.   Genitourinary: Negative.  Negative for hematuria.   Musculoskeletal: Negative.  Negative for gait problem.   Skin: Negative.  Negative for color change, rash and wound.   Allergic/Immunologic: Negative.  Negative for environmental allergies and food allergies.   Neurological:  Positive for dizziness and light-headedness. Negative for syncope, weakness and headaches.   Hematological: Negative.  Does not bruise/bleed easily.   Psychiatric/Behavioral: Negative.  Negative for sleep disturbance.        Objective   Vitals:    05/27/25 0907   BP: 119/71   BP Location: Right arm   Patient Position: Sitting   Cuff Size: Adult   Pulse: 61   SpO2: 98%   Weight: 68.9 kg (152 lb)   Height: 170.2 cm (67\")      /71 (BP Location: Right arm, Patient Position: Sitting, Cuff Size: Adult)   Pulse 61   Ht 170.2 cm (67\")   Wt 68.9 kg (152 lb)   SpO2 98%   BMI 23.81 kg/m²     Lab Results (most recent)       None            Physical Exam  Vitals and nursing note reviewed.   Constitutional:       General: She is not in acute distress.     Appearance: Normal appearance. She is well-developed.   HENT:      Head: Normocephalic and atraumatic.   Eyes:      General: No scleral icterus.        Right eye: No discharge.         Left eye: No discharge.      Conjunctiva/sclera: Conjunctivae normal.   Neck:      Vascular: No carotid bruit.   Cardiovascular:      Rate and Rhythm: Normal rate and regular rhythm.      Heart sounds: Normal heart " sounds. No murmur heard.     No friction rub. No gallop.   Pulmonary:      Effort: Pulmonary effort is normal. No respiratory distress.      Breath sounds: Normal breath sounds. No wheezing or rales.   Chest:      Chest wall: No tenderness.   Musculoskeletal:      Right lower leg: No edema.      Left lower leg: No edema.   Skin:     General: Skin is warm and dry.      Coloration: Skin is not pale.      Findings: No erythema or rash.   Neurological:      Mental Status: She is alert and oriented to person, place, and time.      Cranial Nerves: No cranial nerve deficit.   Psychiatric:         Behavior: Behavior normal.         Procedure   Procedures       Assessment & Plan     Problems Addressed this Visit          Cardiac and Vasculature    Palpitations    POTS (postural orthostatic tachycardia syndrome) - Primary       Symptoms and Signs    Dizziness     Diagnoses         Codes Comments      POTS (postural orthostatic tachycardia syndrome)    -  Primary ICD-10-CM: G90.A  ICD-9-CM: 427.89       Palpitations     ICD-10-CM: R00.2  ICD-9-CM: 785.1       Dizziness     ICD-10-CM: R42  ICD-9-CM: 780.4                    Recommendations  1.  Patient is a 25-year-old female with history of POTS doing remarkably well on medication.  She still has mild symptoms of dizziness or lightheadedness upon standing.  We can monitor for now.  If symptoms were to progress, I want her to call the office as discussed.    2.  Patient with palpitations that are manageable.  She does not describe any symptoms to suggest malignant arrhythmia.  We can monitor for now.    3.  We can see her back for follow-up in a year or sooner if symptoms warrant change.  Follow-up with primary as scheduled.    Irina Winter  reports that she has never smoked. She has never used smokeless tobacco.     Patient did not bring med list or medicine bottles to appointment, med list has been reviewed and updated based on patient's knowledge of their meds.       Electronically signed by:

## 2025-05-27 NOTE — LETTER
"May 27, 2025     Mary Ann Watts, APRN  [address] [facility] [address]    Patient: Irina Winter   YOB: 2000   Date of Visit: 5/27/2025       Dear Mary Ann Watts    Irina Winter was in my office today. Below is a copy of my note.    If you have questions, please do not hesitate to call me. I look forward to following Irina along with you.         Sincerely,        KWAME Lemons        CC: No Recipients    Problem list     Subjective  Irina Winter is a 25 y.o. female     Chief Complaint   Patient presents with   • 6 month follow up     Palpitations   PROBLEM LIST:      Syncope  SOB  Palpitations  Asthma       HPI    Patient is a 25-year-old female with POTS that presents back to the office for follow-up.  She was placed on medication last visit with fludrocortisone to help with orthostatic issues.  She was placed on metoprolol to help regards to palpitations.    Clinically, she has felt significant improvement.  She has not described \"spells\".  She will feel occasional palpitations.  She can have some dizziness upon standing but it is manageable.    She does not describe any chest pain or dyspnea at all.  No complaints of any syncopal episodes.  She is stable otherwise.      Current Outpatient Medications on File Prior to Visit   Medication Sig Dispense Refill   • fludrocortisone 0.1 MG tablet TAKE 1 TABLET BY MOUTH ONCE DAILY 90 tablet 3   • metoprolol succinate XL (TOPROL-XL) 25 MG 24 hr tablet Take 0.5 tablets by mouth Daily. 30 tablet 11   • [DISCONTINUED] fludrocortisone 0.1 MG tablet Take 1 tablet by mouth Daily. 30 tablet 5   • [DISCONTINUED] fluticasone (FLONASE) 50 MCG/ACT nasal spray Administer 2 sprays into the nostril(s) as directed by provider Daily. 64 g 0     No current facility-administered medications on file prior to visit.       Patient has no known allergies.    Past Medical History:   Diagnosis Date   • Asthma    • Eczema    • Palpitation    • Syncope and collapse  " "      Social History     Socioeconomic History   • Marital status: Single   Tobacco Use   • Smoking status: Never   • Smokeless tobacco: Never   Vaping Use   • Vaping status: Never Used   Substance and Sexual Activity   • Alcohol use: No   • Drug use: No   • Sexual activity: Defer       Family History   Problem Relation Age of Onset   • Hypertension Mother    • Anxiety disorder Father        Review of Systems   Constitutional:  Negative for activity change, appetite change, chills, fatigue and fever.   HENT: Negative.  Negative for congestion, sinus pressure and sinus pain.    Eyes: Negative.  Negative for visual disturbance.   Respiratory: Negative.  Negative for apnea, cough, chest tightness, shortness of breath and wheezing.    Cardiovascular: Negative.  Negative for chest pain, palpitations and leg swelling.   Gastrointestinal: Negative.  Negative for blood in stool.   Endocrine: Negative.  Negative for cold intolerance and heat intolerance.   Genitourinary: Negative.  Negative for hematuria.   Musculoskeletal: Negative.  Negative for gait problem.   Skin: Negative.  Negative for color change, rash and wound.   Allergic/Immunologic: Negative.  Negative for environmental allergies and food allergies.   Neurological:  Positive for dizziness and light-headedness. Negative for syncope, weakness and headaches.   Hematological: Negative.  Does not bruise/bleed easily.   Psychiatric/Behavioral: Negative.  Negative for sleep disturbance.        Objective  Vitals:    05/27/25 0907   BP: 119/71   BP Location: Right arm   Patient Position: Sitting   Cuff Size: Adult   Pulse: 61   SpO2: 98%   Weight: 68.9 kg (152 lb)   Height: 170.2 cm (67\")      /71 (BP Location: Right arm, Patient Position: Sitting, Cuff Size: Adult)   Pulse 61   Ht 170.2 cm (67\")   Wt 68.9 kg (152 lb)   SpO2 98%   BMI 23.81 kg/m²     Lab Results (most recent)       None            Physical Exam  Vitals and nursing note reviewed. "   Constitutional:       General: She is not in acute distress.     Appearance: Normal appearance. She is well-developed.   HENT:      Head: Normocephalic and atraumatic.   Eyes:      General: No scleral icterus.        Right eye: No discharge.         Left eye: No discharge.      Conjunctiva/sclera: Conjunctivae normal.   Neck:      Vascular: No carotid bruit.   Cardiovascular:      Rate and Rhythm: Normal rate and regular rhythm.      Heart sounds: Normal heart sounds. No murmur heard.     No friction rub. No gallop.   Pulmonary:      Effort: Pulmonary effort is normal. No respiratory distress.      Breath sounds: Normal breath sounds. No wheezing or rales.   Chest:      Chest wall: No tenderness.   Musculoskeletal:      Right lower leg: No edema.      Left lower leg: No edema.   Skin:     General: Skin is warm and dry.      Coloration: Skin is not pale.      Findings: No erythema or rash.   Neurological:      Mental Status: She is alert and oriented to person, place, and time.      Cranial Nerves: No cranial nerve deficit.   Psychiatric:         Behavior: Behavior normal.         Procedure  Procedures       Assessment & Plan    Problems Addressed this Visit          Cardiac and Vasculature    Palpitations    POTS (postural orthostatic tachycardia syndrome) - Primary       Symptoms and Signs    Dizziness     Diagnoses         Codes Comments      POTS (postural orthostatic tachycardia syndrome)    -  Primary ICD-10-CM: G90.A  ICD-9-CM: 427.89       Palpitations     ICD-10-CM: R00.2  ICD-9-CM: 785.1       Dizziness     ICD-10-CM: R42  ICD-9-CM: 780.4                    Recommendations  1.  Patient is a 25-year-old female with history of POTS doing remarkably well on medication.  She still has mild symptoms of dizziness or lightheadedness upon standing.  We can monitor for now.  If symptoms were to progress, I want her to call the office as discussed.    2.  Patient with palpitations that are manageable.  She does not  describe any symptoms to suggest malignant arrhythmia.  We can monitor for now.    3.  We can see her back for follow-up in a year or sooner if symptoms warrant change.  Follow-up with primary as scheduled.    Irina Winter  reports that she has never smoked. She has never used smokeless tobacco.     Patient did not bring med list or medicine bottles to appointment, med list has been reviewed and updated based on patient's knowledge of their meds.      Electronically signed by: